# Patient Record
Sex: FEMALE | URBAN - METROPOLITAN AREA
[De-identification: names, ages, dates, MRNs, and addresses within clinical notes are randomized per-mention and may not be internally consistent; named-entity substitution may affect disease eponyms.]

---

## 2018-06-25 ENCOUNTER — OFFICE VISIT (OUTPATIENT)
Dept: URBAN - METROPOLITAN AREA CLINIC 81 | Facility: CLINIC | Age: 48
End: 2018-06-25

## 2018-06-25 PROCEDURE — 92004 COMPRE OPH EXAM NEW PT 1/>: CPT | Performed by: OPTOMETRIST

## 2018-06-25 ASSESSMENT — VISUAL ACUITY
OD: 20/20
OS: 20/20

## 2018-06-25 ASSESSMENT — INTRAOCULAR PRESSURE
OS: 18
OD: 16

## 2018-06-25 ASSESSMENT — KERATOMETRY
OD: 43.13
OS: 43.13

## 2018-06-25 NOTE — IMPRESSION/PLAN
Impression: Presbyopia: H52.4. Plan: Discussed diagnosis in detail with patient. Rx detected today likely contributing to symptoms mentioned. New glasses Rx option given. UV protection suggested. May take few days to adapt to changes. Pt understands.

## 2020-06-10 ENCOUNTER — OFFICE VISIT (OUTPATIENT)
Dept: URBAN - METROPOLITAN AREA CLINIC 81 | Facility: CLINIC | Age: 50
End: 2020-06-10

## 2020-06-10 DIAGNOSIS — H52.4 PRESBYOPIA: Primary | ICD-10-CM

## 2020-06-10 DIAGNOSIS — H40.013 OPEN ANGLE WITH BORDERLINE FINDINGS, LOW RISK, BILATERAL: ICD-10-CM

## 2020-06-10 PROCEDURE — 92014 COMPRE OPH EXAM EST PT 1/>: CPT | Performed by: OPTOMETRIST

## 2020-06-10 ASSESSMENT — VISUAL ACUITY
OS: 20/20
OD: 20/20

## 2020-06-10 ASSESSMENT — INTRAOCULAR PRESSURE
OD: 17
OS: 18

## 2020-06-10 ASSESSMENT — KERATOMETRY
OD: 42.50
OS: 43.25

## 2020-06-10 NOTE — IMPRESSION/PLAN
Impression: Open angle with borderline findings, low risk, bilateral: H40.013.

 - glaucoma suspect based on ONH cupping - low risk due to healthy appearing disc margins, lack of apparent change over time and normotensive IOP Plan: Monitor